# Patient Record
Sex: FEMALE | URBAN - METROPOLITAN AREA
[De-identification: names, ages, dates, MRNs, and addresses within clinical notes are randomized per-mention and may not be internally consistent; named-entity substitution may affect disease eponyms.]

---

## 2021-02-10 ENCOUNTER — OFFICE VISIT (OUTPATIENT)
Dept: PRIMARY CARE CLINIC | Age: 46
End: 2021-02-10
Payer: COMMERCIAL

## 2021-02-10 VITALS
SYSTOLIC BLOOD PRESSURE: 138 MMHG | TEMPERATURE: 96.3 F | DIASTOLIC BLOOD PRESSURE: 86 MMHG | HEART RATE: 76 BPM | WEIGHT: 124 LBS | BODY MASS INDEX: 20.66 KG/M2 | HEIGHT: 65 IN | OXYGEN SATURATION: 98 %

## 2021-02-10 DIAGNOSIS — Z00.00 ROUTINE PHYSICAL EXAMINATION: Primary | ICD-10-CM

## 2021-02-10 DIAGNOSIS — Z13.220 LIPID SCREENING: ICD-10-CM

## 2021-02-10 DIAGNOSIS — Z00.00 ROUTINE PHYSICAL EXAMINATION: ICD-10-CM

## 2021-02-10 DIAGNOSIS — E55.9 VITAMIN D DEFICIENCY: ICD-10-CM

## 2021-02-10 DIAGNOSIS — Z13.1 DIABETES MELLITUS SCREENING: ICD-10-CM

## 2021-02-10 LAB
BASOPHILS ABSOLUTE: 0.1 K/UL (ref 0–0.2)
BASOPHILS RELATIVE PERCENT: 0.8 %
EOSINOPHILS ABSOLUTE: 0.1 K/UL (ref 0–0.6)
EOSINOPHILS RELATIVE PERCENT: 0.7 %
HCT VFR BLD CALC: 41.6 % (ref 36–48)
HEMOGLOBIN: 13.5 G/DL (ref 12–16)
LYMPHOCYTES ABSOLUTE: 2.7 K/UL (ref 1–5.1)
LYMPHOCYTES RELATIVE PERCENT: 34.1 %
MCH RBC QN AUTO: 30.7 PG (ref 26–34)
MCHC RBC AUTO-ENTMCNC: 32.5 G/DL (ref 31–36)
MCV RBC AUTO: 94.3 FL (ref 80–100)
MONOCYTES ABSOLUTE: 0.3 K/UL (ref 0–1.3)
MONOCYTES RELATIVE PERCENT: 4.3 %
NEUTROPHILS ABSOLUTE: 4.8 K/UL (ref 1.7–7.7)
NEUTROPHILS RELATIVE PERCENT: 60.1 %
PDW BLD-RTO: 13.5 % (ref 12.4–15.4)
PLATELET # BLD: 307 K/UL (ref 135–450)
PMV BLD AUTO: 9.1 FL (ref 5–10.5)
RBC # BLD: 4.41 M/UL (ref 4–5.2)
WBC # BLD: 7.9 K/UL (ref 4–11)

## 2021-02-10 PROCEDURE — 99396 PREV VISIT EST AGE 40-64: CPT | Performed by: FAMILY MEDICINE

## 2021-02-10 SDOH — ECONOMIC STABILITY: TRANSPORTATION INSECURITY
IN THE PAST 12 MONTHS, HAS LACK OF TRANSPORTATION KEPT YOU FROM MEETINGS, WORK, OR FROM GETTING THINGS NEEDED FOR DAILY LIVING?: NO

## 2021-02-10 SDOH — ECONOMIC STABILITY: FOOD INSECURITY: WITHIN THE PAST 12 MONTHS, THE FOOD YOU BOUGHT JUST DIDN'T LAST AND YOU DIDN'T HAVE MONEY TO GET MORE.: NEVER TRUE

## 2021-02-10 SDOH — ECONOMIC STABILITY: TRANSPORTATION INSECURITY
IN THE PAST 12 MONTHS, HAS THE LACK OF TRANSPORTATION KEPT YOU FROM MEDICAL APPOINTMENTS OR FROM GETTING MEDICATIONS?: NO

## 2021-02-10 ASSESSMENT — ENCOUNTER SYMPTOMS
RESPIRATORY NEGATIVE: 1
ABDOMINAL PAIN: 0
BACK PAIN: 0
EYES NEGATIVE: 1
WHEEZING: 0
CHEST TIGHTNESS: 0

## 2021-02-10 ASSESSMENT — PATIENT HEALTH QUESTIONNAIRE - PHQ9: SUM OF ALL RESPONSES TO PHQ QUESTIONS 1-9: 0

## 2021-02-10 NOTE — PROGRESS NOTES
SUBJECTIVE:  Patient ID: Skylar Brian is a 39 y.o. female. 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 No pertinent past medical history. History reviewed. No pertinent surgical history. Allergies   Allergen Reactions    Bactrim Rash     Family History   Problem Relation Age of Onset    Hypertension Mother         age 72 [place] Thyroid Disease Mother     Elevated Lipids Mother     Other Father         Healthy 79year old [place] Hypertension Father     Hypertension Brother     Mental Illness Brother      Social History     Social History Narrative    RN [facility]        One son 21year old    No Tobacco       There is no problem list on file for this patient. Current Outpatient Medications   Medication Sig Dispense Refill    Multiple Vitamin (MULTIVITAMIN ADULT PO) Take by mouth daily      Norethin-Eth Estrad Triphasic (TRI-NORINYL, 28,) 0.5/1/0.5-35 MG-MCG TABS Take  by mouth daily. No current facility-administered medications for this visit. No results found for: WBC, HGB, HCT, MCV, PLT  No results found for: CHOL  No results found for: TRIG  No results found for: HDL  No results found for: LDLCHOLESTEROL, LDLCALC  No results found for: LABVLDL, VLDL  No results found for: CHOLHDLRATIO    Chemistry    No results found for: NA, K, CL, CO2, BUN, CREATININE No results found for: CALCIUM, ALKPHOS, AST, ALT, BILITOT         Review of Systems   Constitutional: Negative. Good health   HENT: Negative. Eyes: Negative. Respiratory: Negative. Negative for chest tightness and wheezing. Cardiovascular: Negative. Negative for chest pain, palpitations and leg swelling. Gastrointestinal: Negative for abdominal pain. BM regular   Endocrine: Negative. Genitourinary: Negative for dysuria, flank pain and urgency.         Cycle regular  BCP  GYN due in few month  Mammogram gregor end 2-2021   Musculoskeletal: Negative for back pain and neck pain. Skin: Negative for rash. Neurological: Negative for dizziness, light-headedness and headaches. Hematological: Negative. Psychiatric/Behavioral: Negative. Negative for behavioral problems and sleep disturbance. The patient is not nervous/anxious.  Scott Mee[employer] off He will get new job  Son 21year old lives on his own   [facility]       OBJECTIVE:  /86 (Site: Right Upper Arm, Position: Sitting, Cuff Size: Medium Adult)   Pulse 76   Temp 96.3 °F (35.7 °C) (Infrared)   Ht 5' 5\" (1.651 m)   Wt 124 lb (56.2 kg)   LMP 01/27/2021 (Approximate)   SpO2 98%   BMI 20.63 kg/m²   Physical Exam  Constitutional:       General: She is not in acute distress. Appearance: She is well-developed. She is not diaphoretic. HENT:      Head: Normocephalic. Right Ear: External ear normal.      Left Ear: External ear normal.      Nose: Nose normal.   Eyes:      General: No scleral icterus. Right eye: No discharge. Left eye: No discharge. Conjunctiva/sclera: Conjunctivae normal.      Pupils: Pupils are equal, round, and reactive to light. Neck:      Musculoskeletal: Normal range of motion and neck supple. Thyroid: No thyromegaly. Cardiovascular:      Rate and Rhythm: Normal rate and regular rhythm. Heart sounds: Normal heart sounds. No murmur. Pulmonary:      Effort: Pulmonary effort is normal. No respiratory distress. Breath sounds: Normal breath sounds. No wheezing or rales. Chest:      Chest wall: No tenderness. Abdominal:      General: Bowel sounds are normal. There is no distension. Palpations: Abdomen is soft. There is no mass. Tenderness: There is no abdominal tenderness. There is no guarding or rebound. Musculoskeletal: Normal range of motion. Lymphadenopathy:      Cervical: No cervical adenopathy. Skin:     General: Skin is warm.       Findings: No rash.   Neurological:      Mental Status: She is alert and oriented to person, place, and time. Deep Tendon Reflexes: Reflexes are normal and symmetric. Psychiatric:         Behavior: Behavior normal.         Thought Content: Thought content normal.         Judgment: Judgment normal.         ASSESSMENT/PLAN:      Diagnosis Orders   1. Routine physical examination  Hepatitis C Antibody    CBC Auto Differential    Comprehensive Metabolic Panel    Hemoglobin A1C    Lipid Panel    TSH without Reflex    Vitamin D 25 Hydroxy   2. Lipid screening  Lipid Panel   3. Diabetes mellitus screening  Hemoglobin A1C   4.  Vitamin D deficiency  Vitamin D 25 Hydroxy         Flu vaccine done  Covid decline so far

## 2021-02-11 LAB
A/G RATIO: 1.6 (ref 1.1–2.2)
ALBUMIN SERPL-MCNC: 4.2 G/DL (ref 3.4–5)
ALP BLD-CCNC: 60 U/L (ref 40–129)
ALT SERPL-CCNC: 16 U/L (ref 10–40)
ANION GAP SERPL CALCULATED.3IONS-SCNC: 11 MMOL/L (ref 3–16)
AST SERPL-CCNC: 17 U/L (ref 15–37)
BILIRUB SERPL-MCNC: 0.4 MG/DL (ref 0–1)
BUN BLDV-MCNC: 15 MG/DL (ref 7–20)
CALCIUM SERPL-MCNC: 9.2 MG/DL (ref 8.3–10.6)
CHLORIDE BLD-SCNC: 102 MMOL/L (ref 99–110)
CHOLESTEROL, TOTAL: 184 MG/DL (ref 0–199)
CO2: 26 MMOL/L (ref 21–32)
CREAT SERPL-MCNC: 0.7 MG/DL (ref 0.6–1.1)
ESTIMATED AVERAGE GLUCOSE: 102.5 MG/DL
GFR AFRICAN AMERICAN: >60
GFR NON-AFRICAN AMERICAN: >60
GLOBULIN: 2.7 G/DL
GLUCOSE BLD-MCNC: 88 MG/DL (ref 70–99)
HBA1C MFR BLD: 5.2 %
HDLC SERPL-MCNC: 48 MG/DL (ref 40–60)
HEPATITIS C ANTIBODY INTERPRETATION: NORMAL
LDL CHOLESTEROL CALCULATED: 115 MG/DL
POTASSIUM SERPL-SCNC: 4.2 MMOL/L (ref 3.5–5.1)
SODIUM BLD-SCNC: 139 MMOL/L (ref 136–145)
TOTAL PROTEIN: 6.9 G/DL (ref 6.4–8.2)
TRIGL SERPL-MCNC: 107 MG/DL (ref 0–150)
TSH SERPL DL<=0.05 MIU/L-ACNC: 1.27 UIU/ML (ref 0.27–4.2)
VITAMIN D 25-HYDROXY: 59.7 NG/ML
VLDLC SERPL CALC-MCNC: 21 MG/DL

## 2022-05-26 LAB — PAP SMEAR, EXTERNAL: 0

## 2024-02-23 ENCOUNTER — TELEPHONE (OUTPATIENT)
Dept: PRIMARY CARE CLINIC | Age: 49
End: 2024-02-23

## 2024-02-23 NOTE — TELEPHONE ENCOUNTER
----- Message from Tl Velasquez Jose Matheus sent at 2/23/2024 11:16 AM EST -----  Regarding: [facility] Appointment Request  [facility] Appointment Request    Patient needs appointment for [facility] Appointment Type: New to Provider. (One Time Only)    Reason for Appointment Request: Requested Provider unavailable    Additional: She wants to back an appointment today as she has rashes; front of the neck all the way back and also on her ear. She suspected it as Shingles. If Dr. Grace isn't available anyone will do (this is temporary change if PCP isn't available).  --------------------------------------------------------------------------------------------------------------------------    Relationship to Patient: Self     Call Back Information: OK to leave message on voicemail  Preferred Call Back Number: [phone]

## 2024-12-05 ENCOUNTER — TELEPHONE (OUTPATIENT)
Dept: PRIMARY CARE CLINIC | Age: 49
End: 2024-12-05

## 2024-12-05 NOTE — TELEPHONE ENCOUNTER
----- Message from Fabian VIZCARRA sent at 12/5/2024 10:33 AM EST -----  Regarding: [facility] Appointment Request  [facility] Appointment Request    Patient needs appointment for E[facility] Appointment Type: Annual Visit.    Patient Requested Dates(s): ASAP    Patient Requested Time: Anytime    Provider Name:    Monica Grace MD        Reason for Appointment Request: Established Patient - No appointments available during search  Wants to have an appointment for her AWV with  Monica Grace MD.        --------------------------------------------------------------------------------------------------------------------------    Relationship to Patient: Self     Call Back Information: OK to leave message on [employer]    Preferred Call Back Number: Phone [phone]

## 2025-01-23 NOTE — PROGRESS NOTES
ENCOUNTER DATE: 1/28/2025     NAME: Villa Mcguire   AGE: 49 y.o.   GENDER: female   YOB: 1975    Chief Complaint   Patient presents with    Annual Exam    Establish Care       ASSESSMENT/PLAN:  1. Annual physical exam  GYN notes reviewed  Patient will return for fasting labs.  Orders entered  Pap up-to-date per GYN  Mammo up-to-date 1/2025  Due for colon cancer screening.  Discussed options.  Patient would like to complete Cologuard.  Order placed.  Additional info given  Patient will check with employer regarding hep B series and Tdap  Declines flu vaccine  - CBC; Future  - Comprehensive Metabolic Panel; Future  - Lipid Panel; Future  - TSH; Future  - Hemoglobin A1C; Future    2. Screening for colon cancer  - Fecal DNA Colorectal cancer screening (Cologuard)      Return in about 1 year (around 1/28/2026) for physical.     HPI:   Patient is here as a new patient to reestablish care and for a physical  Last OV with our office 2/2021    No chronic medical problems.   No special diet.  Tries to eat healthy  Exercises 2 times per week    GYN  Follows with GYN at [facility]  Pap smear for cervical cancer screening 05/2022   neg/hpv neg   On OCPs.     MAMMO  1/10/2025 at [facility].  Normal    C-SCOPE  Due for colon cancer screening  Would like to do cologuard  No FH colon cancer    LIPIDS  Last FLP 2021      HM  Pretty sure she has had the hep B series and likely Tdap for her work    ROS:  Review of Systems   Constitutional:  Negative for activity change, appetite change, chills, fatigue and unexpected weight change.   HENT:  Negative for congestion, ear pain, hearing loss, nosebleeds, postnasal drip, sneezing and sore throat.    Respiratory:  Negative for cough, chest tightness, shortness of breath and wheezing.    Cardiovascular:  Negative for chest pain, palpitations and leg swelling.   Gastrointestinal:  Negative for abdominal pain, blood in stool, constipation, diarrhea, nausea and vomiting.

## 2025-01-28 ENCOUNTER — OFFICE VISIT (OUTPATIENT)
Dept: PRIMARY CARE CLINIC | Age: 50
End: 2025-01-28
Payer: COMMERCIAL

## 2025-01-28 VITALS
SYSTOLIC BLOOD PRESSURE: 130 MMHG | BODY MASS INDEX: 20.83 KG/M2 | RESPIRATION RATE: 16 BRPM | HEART RATE: 75 BPM | TEMPERATURE: 98 F | HEIGHT: 65 IN | WEIGHT: 125 LBS | OXYGEN SATURATION: 99 % | DIASTOLIC BLOOD PRESSURE: 86 MMHG

## 2025-01-28 DIAGNOSIS — Z00.00 ANNUAL PHYSICAL EXAM: Primary | ICD-10-CM

## 2025-01-28 DIAGNOSIS — Z12.11 SCREENING FOR COLON CANCER: ICD-10-CM

## 2025-01-28 PROCEDURE — 99386 PREV VISIT NEW AGE 40-64: CPT | Performed by: NURSE PRACTITIONER

## 2025-01-28 SDOH — ECONOMIC STABILITY: FOOD INSECURITY: WITHIN THE PAST 12 MONTHS, YOU WORRIED THAT YOUR FOOD WOULD RUN OUT BEFORE YOU GOT MONEY TO BUY MORE.: NEVER TRUE

## 2025-01-28 SDOH — ECONOMIC STABILITY: FOOD INSECURITY: WITHIN THE PAST 12 MONTHS, THE FOOD YOU BOUGHT JUST DIDN'T LAST AND YOU DIDN'T HAVE MONEY TO GET MORE.: NEVER TRUE

## 2025-01-28 ASSESSMENT — ENCOUNTER SYMPTOMS
WHEEZING: 0
SHORTNESS OF BREATH: 0
CHEST TIGHTNESS: 0
NAUSEA: 0
ABDOMINAL PAIN: 0
BACK PAIN: 0
COUGH: 0
BLOOD IN STOOL: 0
DIARRHEA: 0
VOMITING: 0
CONSTIPATION: 0
SORE THROAT: 0

## 2025-01-28 ASSESSMENT — PATIENT HEALTH QUESTIONNAIRE - PHQ9
SUM OF ALL RESPONSES TO PHQ QUESTIONS 1-9: 0
1. LITTLE INTEREST OR PLEASURE IN DOING THINGS: NOT AT ALL
2. FEELING DOWN, DEPRESSED OR HOPELESS: NOT AT ALL
SUM OF ALL RESPONSES TO PHQ QUESTIONS 1-9: 0
SUM OF ALL RESPONSES TO PHQ9 QUESTIONS 1 & 2: 0

## 2025-02-06 DIAGNOSIS — Z00.00 ANNUAL PHYSICAL EXAM: ICD-10-CM

## 2025-02-07 LAB
ALBUMIN SERPL-MCNC: 4.1 G/DL (ref 3.4–5)
ALBUMIN/GLOB SERPL: 1.8 {RATIO} (ref 1.1–2.2)
ALP SERPL-CCNC: 59 U/L (ref 40–129)
ALT SERPL-CCNC: 23 U/L (ref 10–40)
ANION GAP SERPL CALCULATED.3IONS-SCNC: 9 MMOL/L (ref 3–16)
AST SERPL-CCNC: 21 U/L (ref 15–37)
BILIRUB SERPL-MCNC: <0.2 MG/DL (ref 0–1)
BUN SERPL-MCNC: 11 MG/DL (ref 7–20)
CALCIUM SERPL-MCNC: 9.3 MG/DL (ref 8.3–10.6)
CHLORIDE SERPL-SCNC: 106 MMOL/L (ref 99–110)
CHOLEST SERPL-MCNC: 179 MG/DL (ref 0–199)
CO2 SERPL-SCNC: 29 MMOL/L (ref 21–32)
CREAT SERPL-MCNC: 0.7 MG/DL (ref 0.6–1.1)
DEPRECATED RDW RBC AUTO: 13.8 % (ref 12.4–15.4)
EST. AVERAGE GLUCOSE BLD GHB EST-MCNC: 99.7 MG/DL
GFR SERPLBLD CREATININE-BSD FMLA CKD-EPI: >90 ML/MIN/{1.73_M2}
GLUCOSE SERPL-MCNC: 85 MG/DL (ref 70–99)
HBA1C MFR BLD: 5.1 %
HCT VFR BLD AUTO: 40.9 % (ref 36–48)
HDLC SERPL-MCNC: 42 MG/DL (ref 40–60)
HGB BLD-MCNC: 13.6 G/DL (ref 12–16)
LDLC SERPL CALC-MCNC: 111 MG/DL
MCH RBC QN AUTO: 31.5 PG (ref 26–34)
MCHC RBC AUTO-ENTMCNC: 33.3 G/DL (ref 31–36)
MCV RBC AUTO: 94.7 FL (ref 80–100)
PLATELET # BLD AUTO: 296 K/UL (ref 135–450)
PMV BLD AUTO: 9.1 FL (ref 5–10.5)
POTASSIUM SERPL-SCNC: 4.6 MMOL/L (ref 3.5–5.1)
PROT SERPL-MCNC: 6.4 G/DL (ref 6.4–8.2)
RBC # BLD AUTO: 4.32 M/UL (ref 4–5.2)
SODIUM SERPL-SCNC: 144 MMOL/L (ref 136–145)
TRIGL SERPL-MCNC: 128 MG/DL (ref 0–150)
TSH SERPL DL<=0.005 MIU/L-ACNC: 2.24 UIU/ML (ref 0.27–4.2)
VLDLC SERPL CALC-MCNC: 26 MG/DL
WBC # BLD AUTO: 5.6 K/UL (ref 4–11)

## 2025-02-12 DIAGNOSIS — R19.5 POSITIVE COLORECTAL CANCER SCREENING USING COLOGUARD TEST: Primary | ICD-10-CM

## 2025-02-12 DIAGNOSIS — Z12.11 SCREENING FOR COLON CANCER: ICD-10-CM

## 2025-02-12 LAB — NONINV COLON CA DNA+OCC BLD SCRN STL QL: POSITIVE
